# Patient Record
Sex: MALE | Race: WHITE | URBAN - METROPOLITAN AREA
[De-identification: names, ages, dates, MRNs, and addresses within clinical notes are randomized per-mention and may not be internally consistent; named-entity substitution may affect disease eponyms.]

---

## 2022-06-07 ENCOUNTER — APPOINTMENT (OUTPATIENT)
Dept: URBAN - METROPOLITAN AREA CLINIC 232 | Age: 87
Setting detail: DERMATOLOGY
End: 2022-06-08

## 2022-06-07 DIAGNOSIS — D49.2 NEOPLASM OF UNSPECIFIED BEHAVIOR OF BONE, SOFT TISSUE, AND SKIN: ICD-10-CM

## 2022-06-07 DIAGNOSIS — L21.8 OTHER SEBORRHEIC DERMATITIS: ICD-10-CM

## 2022-06-07 PROCEDURE — OTHER PHOTO-DOCUMENTATION: OTHER

## 2022-06-07 PROCEDURE — OTHER COUNSELING: OTHER

## 2022-06-07 PROCEDURE — OTHER BIOPSY BY SHAVE METHOD: OTHER

## 2022-06-07 PROCEDURE — OTHER MIPS QUALITY: OTHER

## 2022-06-07 PROCEDURE — 69100 BIOPSY OF EXTERNAL EAR: CPT

## 2022-06-07 PROCEDURE — OTHER ADDITIONAL NOTES: OTHER

## 2022-06-07 PROCEDURE — OTHER TREATMENT REGIMEN: OTHER

## 2022-06-07 PROCEDURE — 99203 OFFICE O/P NEW LOW 30 MIN: CPT | Mod: 25

## 2022-06-07 ASSESSMENT — LOCATION ZONE DERM: LOCATION ZONE: EAR

## 2022-06-07 ASSESSMENT — LOCATION DETAILED DESCRIPTION DERM
LOCATION DETAILED: LEFT CAVUM CONCHA
LOCATION DETAILED: RIGHT ANTIHELIX
LOCATION DETAILED: RIGHT CAVUM CONCHA

## 2022-06-07 ASSESSMENT — LOCATION SIMPLE DESCRIPTION DERM
LOCATION SIMPLE: LEFT EAR
LOCATION SIMPLE: RIGHT EAR

## 2022-06-07 NOTE — PROCEDURE: TREATMENT REGIMEN
Detail Level: Zone
Modify Regimen: Hold Clobetasol solution on right ear until biopsy site is healed so avoid irritation to biopsy site.
Continue Regimen: Pt may useAug Betamethasone lotion in left ear bid up to 2 week intervals prn

## 2022-06-07 NOTE — PROCEDURE: ADDITIONAL NOTES
Detail Level: Detailed
Render Risk Assessment In Note?: no
Additional Notes: Pt reports his wife just had open heart surgery and going thru rehab. Explained if this lesion is + for skin cancer it will need Mohs but could wait until she is stable enough for him to leave her at home alone while he has surgery.

## 2022-06-07 NOTE — PROCEDURE: BIOPSY BY SHAVE METHOD
Bill [id number] For Specimen Handling/Conveyance To Laboratory?: no
X Size Of Lesion In Cm: 0
Billing Type: Third-Party Bill
Was A Bandage Applied: Yes
Silver Nitrate Text: The wound bed was treated with silver nitrate after the biopsy was performed.
Cryotherapy Text: The wound bed was treated with cryotherapy after the biopsy was performed.
Electrodesiccation And Curettage Text: The wound bed was treated with electrodesiccation and curettage after the biopsy was performed.
Anesthesia Volume In Cc (Will Not Render If 0): 0.3
Hemostasis: Drysol and Electrocautery
Depth Of Biopsy: dermis
Biopsy Method: 15 blade
Information: Selecting Yes will display possible errors in your note based on the variables you have selected. This validation is only offered as a suggestion for you. PLEASE NOTE THAT THE VALIDATION TEXT WILL BE REMOVED WHEN YOU FINALIZE YOUR NOTE. IF YOU WANT TO FAX A PRELIMINARY NOTE YOU WILL NEED TO TOGGLE THIS TO 'NO' IF YOU DO NOT WANT IT IN YOUR FAXED NOTE.
Size Of Lesion In Cm: 0.6
Curettage Text: The wound bed was treated with curettage after the biopsy was performed.
Consent was obtained and risks were reviewed including but not limited to scarring, infection, bleeding, scabbing, incomplete removal, nerve damage and allergy to anesthesia.
Type Of Destruction Used: Curettage
Anesthesia Type: 1% lidocaine with epinephrine and a 1:10 solution of 8.4% sodium bicarbonate
Biopsy Type: H and E
Electrodesiccation Text: The wound bed was treated with electrodesiccation after the biopsy was performed.
Post-Care Instructions: I reviewed with the patient in detail post-care instructions. Patient is to keep the biopsy site covered with vaseline and a bandage. Pt is to change the bandage daily for about 5-7 days or until well healed.
Detail Level: Detailed
Notification Instructions: Patient will be notified of biopsy results. However, patient instructed to call the office if not contacted within 3 weeks.
Dressing: bandage
Wound Care: Petrolatum

## 2022-06-07 NOTE — HPI: RASH
What Type Of Note Output Would You Prefer (Optional)?: Standard Output
How Severe Is Your Rash?: mild
Is This A New Presentation, Or A Follow-Up?: Rash
Additional History: Patient states neosporin and correct x ointment improve condition.

## 2022-06-22 ENCOUNTER — APPOINTMENT (OUTPATIENT)
Dept: URBAN - METROPOLITAN AREA CLINIC 286 | Age: 87
Setting detail: DERMATOLOGY
End: 2022-06-27

## 2022-06-22 PROBLEM — D04.21 CARCINOMA IN SITU OF SKIN OF RIGHT EAR AND EXTERNAL AURICULAR CANAL: Status: ACTIVE | Noted: 2022-06-22

## 2022-06-22 PROCEDURE — OTHER PATIENT SPECIFIC COUNSELING: OTHER

## 2022-06-22 PROCEDURE — 17312 MOHS ADDL STAGE: CPT

## 2022-06-22 PROCEDURE — OTHER TREATMENT REGIMEN: OTHER

## 2022-06-22 PROCEDURE — 17311 MOHS 1 STAGE H/N/HF/G: CPT

## 2022-06-22 PROCEDURE — A4550 SURGICAL TRAYS: HCPCS

## 2022-06-22 PROCEDURE — OTHER MOHS SURGERY: OTHER

## 2022-06-22 NOTE — PROCEDURE: PATIENT SPECIFIC COUNSELING
Detail Level: Zone
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 I discussed initiating efudex to the area once the site fully heals from the surgery. I discussed repair options including allowing the wound to heal via secondary intention or closure with a full thickness skin graft. I explained the risks and benefits of each repair in detail with them. The patient has elected to allow the wound to heal via secondary intention. He will follow up in two weeks for a wound check with a possible delayed graft depending on the healing of the wound. The patient will follow the wound care and follow up in two weeks.

## 2022-06-22 NOTE — PROCEDURE: MOHS SURGERY
Stable on meds.  Bp controlled   Surgeon/Pathologist Verbiage (Will Incorporate Name Of Surgeon From Intro If Not Blank): operated in two distinct and integrated capacities as the surgeon and pathologist.

## 2022-06-22 NOTE — PROCEDURE: TREATMENT REGIMEN
No deformity or limitation of movement
Detail Level: Zone
Plan: Will discuss initiating efudex at the patient's wound care appointment.

## 2022-06-28 ENCOUNTER — APPOINTMENT (OUTPATIENT)
Dept: URBAN - METROPOLITAN AREA CLINIC 286 | Age: 87
Setting detail: DERMATOLOGY
End: 2022-07-04

## 2022-06-28 DIAGNOSIS — Z48.817 ENCOUNTER FOR SURGICAL AFTERCARE FOLLOWING SURGERY ON THE SKIN AND SUBCUTANEOUS TISSUE: ICD-10-CM

## 2022-06-28 PROCEDURE — OTHER POST-OP WOUND CHECK (NO GLOBAL PERIOD): OTHER

## 2022-06-28 PROCEDURE — OTHER PRESCRIPTION: OTHER

## 2022-06-28 PROCEDURE — 99212 OFFICE O/P EST SF 10 MIN: CPT

## 2022-06-28 RX ORDER — GENTAMICIN SULFATE 1 MG/G
OINTMENT TOPICAL
Qty: 30 | Refills: 0 | Status: ERX | COMMUNITY
Start: 2022-06-28

## 2022-06-28 ASSESSMENT — LOCATION SIMPLE DESCRIPTION DERM: LOCATION SIMPLE: RIGHT EAR

## 2022-06-28 ASSESSMENT — LOCATION ZONE DERM: LOCATION ZONE: EAR

## 2022-06-28 ASSESSMENT — LOCATION DETAILED DESCRIPTION DERM: LOCATION DETAILED: RIGHT SUPERIOR CRUS OF ANTIHELIX

## 2022-06-28 NOTE — PROCEDURE: POST-OP WOUND CHECK (NO GLOBAL PERIOD)
Additional Comments: The wound is slightly sore to touch. Good granulation tissue peripherally. The area was cleansed and anesthetized in the normal manner and then a 2-mm punch biopsy was used to perforate the cartilage centrally to encourage central granulation. Discussed wound care at length with patient and family member. Pt to continue second intention wound care. We will switch to gentamycin ointment to prevent infection. All questions answered. Wound check in 2-3 weeks.
Wound Evaluated By: Dr. Hilario
Detail Level: Detailed

## 2022-07-12 ENCOUNTER — APPOINTMENT (OUTPATIENT)
Dept: URBAN - METROPOLITAN AREA CLINIC 286 | Age: 87
Setting detail: DERMATOLOGY
End: 2022-07-18

## 2022-07-12 DIAGNOSIS — Z48.817 ENCOUNTER FOR SURGICAL AFTERCARE FOLLOWING SURGERY ON THE SKIN AND SUBCUTANEOUS TISSUE: ICD-10-CM

## 2022-07-12 PROCEDURE — 99212 OFFICE O/P EST SF 10 MIN: CPT

## 2022-07-12 PROCEDURE — OTHER POST-OP WOUND CHECK (NO GLOBAL PERIOD): OTHER

## 2022-07-12 ASSESSMENT — LOCATION DETAILED DESCRIPTION DERM: LOCATION DETAILED: RIGHT SUPERIOR CRUS OF ANTIHELIX

## 2022-07-12 ASSESSMENT — LOCATION ZONE DERM: LOCATION ZONE: EAR

## 2022-07-12 ASSESSMENT — LOCATION SIMPLE DESCRIPTION DERM: LOCATION SIMPLE: RIGHT EAR

## 2022-07-12 NOTE — PROCEDURE: POST-OP WOUND CHECK (NO GLOBAL PERIOD)
Detail Level: Detailed
Additional Comments: Granulating well with islands of granulation tissue present peripherally and through the cartilage fenestrations. No more pain or evidence of chondritis. Pt to continue regimen of second intention wound healing. All questions answered.
Wound Evaluated By: Dr. Hilario

## 2022-08-12 ENCOUNTER — APPOINTMENT (OUTPATIENT)
Dept: URBAN - METROPOLITAN AREA CLINIC 286 | Age: 87
Setting detail: DERMATOLOGY
End: 2022-08-17

## 2022-08-12 DIAGNOSIS — L57.0 ACTINIC KERATOSIS: ICD-10-CM

## 2022-08-12 DIAGNOSIS — Z48.817 ENCOUNTER FOR SURGICAL AFTERCARE FOLLOWING SURGERY ON THE SKIN AND SUBCUTANEOUS TISSUE: ICD-10-CM

## 2022-08-12 PROCEDURE — OTHER PRESCRIPTION: OTHER

## 2022-08-12 PROCEDURE — OTHER POST-OP WOUND CHECK (NO GLOBAL PERIOD): OTHER

## 2022-08-12 PROCEDURE — OTHER MEDICATION COUNSELING: OTHER

## 2022-08-12 PROCEDURE — OTHER COUNSELING: OTHER

## 2022-08-12 PROCEDURE — OTHER PATIENT SPECIFIC COUNSELING: OTHER

## 2022-08-12 PROCEDURE — 99213 OFFICE O/P EST LOW 20 MIN: CPT

## 2022-08-12 RX ORDER — FLUOROURACIL 5 MG/G
CREAM TOPICAL
Qty: 40 | Refills: 0 | Status: ERX | COMMUNITY
Start: 2022-08-12

## 2022-08-12 ASSESSMENT — LOCATION SIMPLE DESCRIPTION DERM
LOCATION SIMPLE: LEFT EAR
LOCATION SIMPLE: RIGHT EAR

## 2022-08-12 ASSESSMENT — LOCATION DETAILED DESCRIPTION DERM
LOCATION DETAILED: LEFT SUPERIOR CRUS OF ANTIHELIX
LOCATION DETAILED: RIGHT SUPERIOR CRUS OF ANTIHELIX

## 2022-08-12 ASSESSMENT — LOCATION ZONE DERM: LOCATION ZONE: EAR

## 2022-08-12 NOTE — PROCEDURE: MEDICATION COUNSELING
Opzelura Pregnancy And Lactation Text: There is insufficient data to evaluate drug-associated risk for major birth defects, miscarriage, or other adverse maternal or fetal outcomes.  There is a pregnancy registry that monitors pregnancy outcomes in pregnant persons exposed to the medication during pregnancy.  It is unknown if this medication is excreted in breast milk.  Do not breastfeed during treatment and for about 4 weeks after the last dose. Detail Level: Zone Render In Strict Bullet Format?: No Initiate Treatment: Onexton 1.2 % (1 % base)-3.75 % topical gel with pump - Apply a pea sized amount to the full face in the morning after cleansing\\n\\nArazlo 0.045 % lotion- Apply a pea size amount to full face every night after cleansing\\n\\ndoxycycline hyclate 100 mg capsule - Take one capsule once daily with dinner x1 month

## 2022-08-12 NOTE — PROCEDURE: MEDICATION COUNSELING
Reviewed.  We can test for RSV at the time of the appointment.   Thalidomide Counseling: I discussed with the patient the risks of thalidomide including but not limited to birth defects, anxiety, weakness, chest pain, dizziness, cough and severe allergy.

## 2022-08-12 NOTE — PROCEDURE: PATIENT SPECIFIC COUNSELING
Reassured the patient that the wound has mostly healed at this point. There is a very small area in the center that will be healed in a week or two. The will continue applying the antibiotic ointment once daily for another week and he can discontinue.
Detail Level: Zone

## 2022-08-12 NOTE — PROCEDURE: MEDICATION COUNSELING
Detail Level: Simple Other (Free Text): Pt stated she did not have a swallowing problem. Note Text (......Xxx Chief Complaint.): This diagnosis correlates with the Wartpeel Counseling:  I discussed with the patient the risks of Wartpeel including but not limited to erythema, scaling, itching, weeping, crusting, and pain.

## 2022-08-12 NOTE — PROCEDURE: POST-OP WOUND CHECK (NO GLOBAL PERIOD)
Wound Evaluated By: Dr. Hilario
Additional Comments: Nearly complete re-epithelialization of the second intention wound. Pt does not have to bandage, but will continue to keep moist with vaseline until small central area has completely healed. No further treatment needed.
Detail Level: Detailed

## 2022-08-12 NOTE — PROCEDURE: COUNSELING
Patient Specific Counseling (Will Not Stick From Patient To Patient): Patient had evidence of AKs after Mohs on R ear and has clinical evidence of AK on left ear. We discussed field therapy with 5FU to apply to both ears, once the Mohs site has completely healed.
Detail Level: Zone

## 2022-11-02 ENCOUNTER — APPOINTMENT (OUTPATIENT)
Dept: URBAN - METROPOLITAN AREA CLINIC 286 | Age: 87
Setting detail: DERMATOLOGY
End: 2022-11-05

## 2022-11-02 DIAGNOSIS — L57.0 ACTINIC KERATOSIS: ICD-10-CM

## 2022-11-02 PROCEDURE — OTHER TREATMENT REGIMEN: OTHER

## 2022-11-02 PROCEDURE — OTHER MIPS QUALITY: OTHER

## 2022-11-02 PROCEDURE — OTHER COUNSELING: OTHER

## 2022-11-02 PROCEDURE — 99213 OFFICE O/P EST LOW 20 MIN: CPT

## 2022-11-02 ASSESSMENT — LOCATION DETAILED DESCRIPTION DERM
LOCATION DETAILED: RIGHT INFERIOR CRUS OF ANTIHELIX
LOCATION DETAILED: LEFT SUPERIOR CRUS OF ANTIHELIX

## 2022-11-02 ASSESSMENT — LOCATION SIMPLE DESCRIPTION DERM
LOCATION SIMPLE: RIGHT EAR
LOCATION SIMPLE: LEFT EAR

## 2022-11-02 ASSESSMENT — LOCATION ZONE DERM: LOCATION ZONE: EAR

## 2022-11-02 NOTE — PROCEDURE: TREATMENT REGIMEN
Discontinue Regimen: Gentamicin ointment
Detail Level: Zone
Initiate Treatment: fluorouracil 5 % topical cream, to right and left ear BID x 6 weeks. (Patient never started yet, from previously prescribed will plan to start in January. Follow up middle of February

## 2023-04-25 NOTE — PROCEDURE: MOHS SURGERY
Rotation Flap Text: The defect edges were debeveled with a #15 scalpel blade.  Given the location of the defect, shape of the defect and the proximity to free margins a rotation flap was deemed most appropriate.  Using a sterile surgical marker, an appropriate rotation flap was drawn incorporating the defect and placing the expected incisions within the relaxed skin tension lines where possible.    The area thus outlined was incised deep to adipose tissue with a #15 scalpel blade.  The skin margins were undermined to an appropriate distance in all directions utilizing iris scissors. Calm

## 2024-04-05 NOTE — PROCEDURE: MOHS SURGERY
Anesthesia Evaluation     Patient summary reviewed and Nursing notes reviewed   no history of anesthetic complications:   NPO Solid Status: > 8 hours  NPO Liquid Status: > 8 hours           Airway   Mallampati: II  TM distance: >3 FB  Neck ROM: full  No difficulty expected  Dental    (+) edentulous    Comment: Studs for dentures      Pulmonary - normal exam    breath sounds clear to auscultation  (+) a smoker (.5 ppd) Current, Abstained day of surgery, cigarettes,  (-) recent URI, sleep apnea  Cardiovascular - normal exam  Exercise tolerance: good (4-7 METS)    ECG reviewed  Rhythm: regular    (+) pacemaker ([employer] 3 years ago), hypertension well controlled 2 medications or greater, CAD, cardiac stents (x3, 2014) more than 12 months ago , dysrhythmias Atrial Fib, Paroxysmal Atrial Fib, hyperlipidemia  (-) angina, DVT    ROS comment: 12 lead EKG  Sinus rhythm  Probable left atrial enlargement  Borderline prolonged QT interval  When compared with ECG of 26-Jul-2022 22:21:59,  Inferior T wave changes have resolved  Electronically Signed By: Fidel Hope ([facility]) 12-Jul-2023 15:37:41  Date and Time of Study: 2023-07-12 12:54:51      Neuro/Psych    ROS Comment: RLS    GI/Hepatic/Renal/Endo    (+) GERD well controlled, GI bleeding upper resolved, diabetes mellitus type 2 well controlled  (-) no thyroid disorder    Musculoskeletal (-) negative ROS    Abdominal  - normal exam    Abdomen: soft.   Substance History   (+) alcohol use (rarely)  (-) drug use     OB/GYN negative ob/gyn ROS         Other   blood dyscrasia anemia,   history of cancer remission                  Anesthesia Plan    ASA 3     MAC   total IV anesthesia  intravenous induction     Anesthetic plan, risks, benefits, and alternatives have been provided, discussed and informed consent has been obtained with: patient and spouse/significant other.    Use of blood products discussed with patient and spouse/significant other  Consented to blood products.    Plan  discussed with CRNA.    CODE STATUS:          Partial Purse String (Simple) Text: Given the location of the defect and the characteristics of the surrounding skin a simple purse string closure was deemed most appropriate.  Undermining was performed circumfirentially around the surgical defect.  A purse string suture was then placed and tightened. Wound tension only allowed a partial closure of the circular defect.